# Patient Record
Sex: FEMALE | Race: WHITE
[De-identification: names, ages, dates, MRNs, and addresses within clinical notes are randomized per-mention and may not be internally consistent; named-entity substitution may affect disease eponyms.]

---

## 2020-05-10 ENCOUNTER — NURSE TRIAGE (OUTPATIENT)
Dept: OTHER | Facility: CLINIC | Age: 65
End: 2020-05-10

## 2020-05-10 NOTE — TELEPHONE ENCOUNTER
Reason for Disposition   [1] Sinus pain (not just congestion) AND [2] fever   Age > 50 years    Answer Assessment - Initial Assessment Questions  1. LOCATION: \"Where does it hurt? \"       head  2. ONSET: \"When did the sinus pain start? \"  (e.g., hours, days)       Yesterday   3. SEVERITY: \"How bad is the pain? \"   (Scale 1-10; mild, moderate or severe)    - MILD (1-3): doesn't interfere with normal activities     - MODERATE (4-7): interferes with normal activities (e.g., work or school) or awakens from sleep    - SEVERE (8-10): excruciating pain and patient unable to do any normal activities         5/10  4. RECURRENT SYMPTOM: \"Have you ever had sinus problems before? \" If so, ask: \"When was the last time? \" and \"What happened that time? \"       yes  5. NASAL CONGESTION: \"Is the nose blocked? \" If so, ask, \"Can you open it or must you breathe through the mouth? \"      yes  6. NASAL DISCHARGE: \"Do you have discharge from your nose? \" If so ask, \"What color? \"      yes  7. FEVER: \"Do you have a fever? \" If so, ask: \"What is it, how was it measured, and when did it start? \"       100.4  8. OTHER SYMPTOMS: \"Do you have any other symptoms? \" (e.g., sore throat, cough, earache, difficulty breathing)      Scratchy throat  9. PREGNANCY: \"Is there any chance you are pregnant? \" \"When was your last menstrual period? \"      NA    Answer Assessment - Initial Assessment Questions  1. SEVERITY: \"How bad is the pain? \"  (e.g., Scale 1-10; mild, moderate, or severe)    - MILD (1-3): complains slightly about urination hurting    - MODERATE (4-7): interferes with normal activities      - SEVERE (8-10): excruciating, unwilling or unable to urinate because of the pain       5/10  2. FREQUENCY: \"How many times have you had painful urination today? \"       alot  3. PATTERN: \"Is pain present every time you urinate or just sometimes? \"       Every time  4. ONSET: \"When did the painful urination start? \"       Yesterday   5.  FEVER: \"Do you have a